# Patient Record
Sex: MALE | NOT HISPANIC OR LATINO | Employment: FULL TIME | URBAN - METROPOLITAN AREA
[De-identification: names, ages, dates, MRNs, and addresses within clinical notes are randomized per-mention and may not be internally consistent; named-entity substitution may affect disease eponyms.]

---

## 2023-03-27 ENCOUNTER — OFFICE VISIT (OUTPATIENT)
Dept: PRIMARY CARE | Facility: CLINIC | Age: 53
End: 2023-03-27
Payer: COMMERCIAL

## 2023-03-27 VITALS
SYSTOLIC BLOOD PRESSURE: 132 MMHG | DIASTOLIC BLOOD PRESSURE: 85 MMHG | WEIGHT: 205 LBS | OXYGEN SATURATION: 98 % | HEART RATE: 84 BPM

## 2023-03-27 DIAGNOSIS — R03.0 ELEVATED BP WITHOUT DIAGNOSIS OF HYPERTENSION: Primary | ICD-10-CM

## 2023-03-27 PROCEDURE — 1036F TOBACCO NON-USER: CPT | Performed by: INTERNAL MEDICINE

## 2023-03-27 PROCEDURE — 99213 OFFICE O/P EST LOW 20 MIN: CPT | Performed by: INTERNAL MEDICINE

## 2023-03-27 ASSESSMENT — PATIENT HEALTH QUESTIONNAIRE - PHQ9
1. LITTLE INTEREST OR PLEASURE IN DOING THINGS: NOT AT ALL
2. FEELING DOWN, DEPRESSED OR HOPELESS: NOT AT ALL
SUM OF ALL RESPONSES TO PHQ9 QUESTIONS 1 AND 2: 0

## 2023-03-27 NOTE — PROGRESS NOTES
Subjective   Patient ID: Ramana Reyes is a 52 y.o. male who presents for Follow-up.    HPI   Overall doing well   BP has been acceptabel at home  Is working on eating less and picking healthier foods   Walks on treadmill 3-4 days weekly   No new issues or concerns     Review of Systems   All other systems reviewed and are negative.      Objective   /86   Pulse 84   Wt 93 kg (205 lb)   SpO2 98%     Physical Exam  Constitutional:       Appearance: Normal appearance.   HENT:      Head: Normocephalic and atraumatic.   Cardiovascular:      Rate and Rhythm: Normal rate and regular rhythm.      Heart sounds: Normal heart sounds. No murmur heard.     No gallop.   Pulmonary:      Effort: Pulmonary effort is normal. No respiratory distress.      Breath sounds: No wheezing or rales.   Abdominal:      General: Abdomen is flat. Bowel sounds are normal.      Palpations: Abdomen is soft.      Tenderness: There is no abdominal tenderness.   Skin:     General: Skin is warm and dry.      Findings: No erythema.   Neurological:      General: No focal deficit present.      Mental Status: He is alert and oriented to person, place, and time. Mental status is at baseline.   Psychiatric:         Mood and Affect: Mood normal.         Behavior: Behavior normal.         Assessment/Plan       #Elevated BP   -Acceptable at home. Cont diet/exercise/wt loss.     Colonoscopy: 2/10/23- 5 years   PSA: 1/14/23    RTC 6 months

## 2023-03-27 NOTE — PATIENT INSTRUCTIONS
KEEP CHECKING BP, NUMBERS ARE ACCEPTABLE. KEPP WALKING !     COME BACK IN 6 MONTHS, CALL ME IF BP GOES MISSY PRIOR TO THAT

## 2023-04-21 ENCOUNTER — OFFICE VISIT (OUTPATIENT)
Dept: PRIMARY CARE | Facility: CLINIC | Age: 53
End: 2023-04-21
Payer: COMMERCIAL

## 2023-04-21 VITALS
OXYGEN SATURATION: 98 % | DIASTOLIC BLOOD PRESSURE: 80 MMHG | SYSTOLIC BLOOD PRESSURE: 138 MMHG | WEIGHT: 201 LBS | HEART RATE: 74 BPM

## 2023-04-21 DIAGNOSIS — G89.29 CHRONIC BILATERAL LOW BACK PAIN WITHOUT SCIATICA: Primary | ICD-10-CM

## 2023-04-21 DIAGNOSIS — M54.50 CHRONIC BILATERAL LOW BACK PAIN WITHOUT SCIATICA: Primary | ICD-10-CM

## 2023-04-21 PROCEDURE — 1036F TOBACCO NON-USER: CPT | Performed by: INTERNAL MEDICINE

## 2023-04-21 PROCEDURE — 99213 OFFICE O/P EST LOW 20 MIN: CPT | Performed by: INTERNAL MEDICINE

## 2023-04-21 RX ORDER — PREDNISONE 20 MG/1
20 TABLET ORAL 2 TIMES DAILY
Qty: 10 TABLET | Refills: 0 | Status: SHIPPED | OUTPATIENT
Start: 2023-04-21 | End: 2023-04-26

## 2023-04-21 ASSESSMENT — PATIENT HEALTH QUESTIONNAIRE - PHQ9
2. FEELING DOWN, DEPRESSED OR HOPELESS: NOT AT ALL
1. LITTLE INTEREST OR PLEASURE IN DOING THINGS: NOT AT ALL
SUM OF ALL RESPONSES TO PHQ9 QUESTIONS 1 AND 2: 0

## 2023-04-21 NOTE — PROGRESS NOTES
Subjective   Patient ID: Ramana Reyes is a 52 y.o. male who presents for Back Pain.    HPI   Reports lower back x 1 mo , L>R. 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 Used a heating pad and hot tube --did not help much, did not take any OTC meds. Standing up and hold a package makes the pain worse. Elevating feet makes the pain better. Avg pain 4/10, worst 8/10.    Review of Systems   All other systems reviewed and are negative.      Objective   /80   Pulse 74   Wt 91.2 kg (201 lb)   SpO2 98%     Physical Exam  Constitutional:       Appearance: Normal appearance.   HENT:      Head: Normocephalic and atraumatic.   Cardiovascular:      Rate and Rhythm: Normal rate and regular rhythm.      Heart sounds: Normal heart sounds. No murmur heard.     No gallop.   Pulmonary:      Effort: Pulmonary effort is normal. No respiratory distress.      Breath sounds: No wheezing or rales.   Skin:     General: Skin is warm and dry.      Findings: No rash.   Neurological:      Mental Status: He is alert and oriented to person, place, and time. Mental status is at baseline.   Psychiatric:         Mood and Affect: Mood normal.         Behavior: Behavior normal.         Assessment/Plan       #Low back pain   -Rx prednisone 20mg bid x 5 days  -Refer to PT

## 2023-04-21 NOTE — PATIENT INSTRUCTIONS
Physical Therapy main number [phone]  [facility] ([facility]) [phone]    Take prednisone 20mg twice daily for 5 days

## 2023-04-28 ENCOUNTER — OFFICE VISIT (OUTPATIENT)
Dept: PRIMARY CARE | Facility: CLINIC | Age: 53
End: 2023-04-28
Payer: COMMERCIAL

## 2023-04-28 VITALS
SYSTOLIC BLOOD PRESSURE: 121 MMHG | WEIGHT: 207 LBS | HEART RATE: 91 BPM | DIASTOLIC BLOOD PRESSURE: 67 MMHG | OXYGEN SATURATION: 96 %

## 2023-04-28 DIAGNOSIS — M54.50 CHRONIC BILATERAL LOW BACK PAIN WITHOUT SCIATICA: Primary | ICD-10-CM

## 2023-04-28 DIAGNOSIS — G89.29 CHRONIC BILATERAL LOW BACK PAIN WITHOUT SCIATICA: Primary | ICD-10-CM

## 2023-04-28 PROCEDURE — 99213 OFFICE O/P EST LOW 20 MIN: CPT | Performed by: INTERNAL MEDICINE

## 2023-04-28 PROCEDURE — 1036F TOBACCO NON-USER: CPT | Performed by: INTERNAL MEDICINE

## 2023-04-28 RX ORDER — CYCLOBENZAPRINE HCL 10 MG
10 TABLET ORAL NIGHTLY PRN
Qty: 30 TABLET | Refills: 0 | Status: SHIPPED | OUTPATIENT
Start: 2023-04-28 | End: 2023-06-05 | Stop reason: ALTCHOICE

## 2023-04-28 RX ORDER — PREDNISONE 10 MG/1
10 TABLET ORAL SEE ADMIN INSTRUCTIONS
Qty: 30 TABLET | Refills: 0 | Status: SHIPPED | OUTPATIENT
Start: 2023-04-28 | End: 2023-06-05 | Stop reason: ALTCHOICE

## 2023-04-28 NOTE — LETTER
April 28, 2023     Patient: Ramana Reyes   YOB: 1970   Date of Visit: 4/28/2023       To Whom It May Concern:    Ramana Reyes was seen in my clinic on 4/28/2023 at 11:45 am. Please excuse Ramana for his absence from work on this day to make the appointment.    Please excuse patient from work April 24, 2023 -April 28, 2023, Can return May 1,2023 no restrictions    If you have any questions or concerns, please don't hesitate to call.         Sincerely,         Feng Rouse,         CC: No Recipients

## 2023-04-28 NOTE — PATIENT INSTRUCTIONS
Take pred taper and flexeril at night for sleep     [facility] main number [phone]  [facility] ([facility]) [phone]

## 2023-04-28 NOTE — PROGRESS NOTES
Subjective   Patient ID: Ramana Reyes is a 52 y.o. male who presents for Back Pain.    HPI     Patient took prednisone for five days,, pain in left lower back improved. After stopping pain worse. No new injury. Standing makes the pain worse, ibuprofen  helped. No pain down his leg.     Review of Systems   All other systems reviewed and are negative.      Objective   /67   Pulse 91   Wt 93.9 kg (207 lb)   SpO2 96%     Physical Exam  Constitutional:       Appearance: Normal appearance.   HENT:      Head: Normocephalic and atraumatic.   Cardiovascular:      Rate and Rhythm: Normal rate and regular rhythm.      Heart sounds: Normal heart sounds. No murmur heard.     No gallop.   Pulmonary:      Effort: Pulmonary effort is normal. No respiratory distress.      Breath sounds: No wheezing or rales.   Musculoskeletal:      Comments: Left lower back pain    Skin:     General: Skin is warm and dry.      Findings: No rash.   Neurological:      Mental Status: He is alert and oriented to person, place, and time. Mental status is at baseline.   Psychiatric:         Mood and Affect: Mood normal.         Behavior: Behavior normal.         Assessment/Plan       #Low back pain   -Rx prednisone taper x 12 days and Flexeril 10mg at bedtime prn   -Encouraged follow up with PT

## 2023-05-01 ENCOUNTER — TELEPHONE (OUTPATIENT)
Dept: PRIMARY CARE | Facility: CLINIC | Age: 53
End: 2023-05-01
Payer: COMMERCIAL

## 2023-05-03 DIAGNOSIS — M54.42 ACUTE LEFT-SIDED LOW BACK PAIN WITH LEFT-SIDED SCIATICA: Primary | ICD-10-CM

## 2023-05-16 ENCOUNTER — TELEPHONE (OUTPATIENT)
Dept: PRIMARY CARE | Facility: CLINIC | Age: 53
End: 2023-05-16
Payer: COMMERCIAL

## 2023-06-05 ENCOUNTER — OFFICE VISIT (OUTPATIENT)
Dept: PRIMARY CARE | Facility: CLINIC | Age: 53
End: 2023-06-05
Payer: COMMERCIAL

## 2023-06-05 VITALS
OXYGEN SATURATION: 98 % | BODY MASS INDEX: 28.37 KG/M2 | WEIGHT: 215 LBS | DIASTOLIC BLOOD PRESSURE: 76 MMHG | HEART RATE: 77 BPM | SYSTOLIC BLOOD PRESSURE: 142 MMHG

## 2023-06-05 DIAGNOSIS — Z01.818 PREOPERATIVE CLEARANCE: Primary | ICD-10-CM

## 2023-06-05 PROCEDURE — 1036F TOBACCO NON-USER: CPT | Performed by: INTERNAL MEDICINE

## 2023-06-05 PROCEDURE — 99213 OFFICE O/P EST LOW 20 MIN: CPT | Performed by: INTERNAL MEDICINE

## 2023-06-05 NOTE — PROGRESS NOTES
Subjective   Patient ID: Ramana Reyes is a 53 y.o. male who presents for Follow-up (Medical clear. ).    HPI     Set to have lumbar surgery on Thursday. Was seen at [facility] and had SBP 150s. BP at home 120-140s.   No CP, SOB  or LE edema   Is anxious for upcoming sugery     Review of Systems    Objective   /76   Pulse 77   Wt 97.5 kg (215 lb)   SpO2 98%   BMI 28.37 kg/m²     Physical Exam  Constitutional:       Appearance: Normal appearance.   HENT:      Head: Normocephalic and atraumatic.   Cardiovascular:      Rate and Rhythm: Normal rate and regular rhythm.      Heart sounds: Normal heart sounds. No murmur heard.     No gallop.   Pulmonary:      Effort: Pulmonary effort is normal. No respiratory distress.      Breath sounds: No wheezing or rales.   Skin:     General: Skin is warm and dry.      Findings: No rash.   Neurological:      Mental Status: He is alert and oriented to person, place, and time. Mental status is at baseline.   Psychiatric:         Mood and Affect: Mood normal.         Behavior: Behavior normal.         Assessment/Plan        #Preoperative clearance   -BP is acceptable. He is cleared for surgery from my perspective.

## 2023-06-08 ENCOUNTER — HOSPITAL ENCOUNTER (OUTPATIENT)
Dept: DATA CONVERSION | Facility: HOSPITAL | Age: 53
End: 2023-06-08
Attending: NEUROLOGICAL SURGERY | Admitting: NEUROLOGICAL SURGERY
Payer: COMMERCIAL

## 2023-06-08 DIAGNOSIS — M51.16 INTERVERTEBRAL DISC DISORDERS WITH RADICULOPATHY, LUMBAR REGION: ICD-10-CM

## 2023-06-08 DIAGNOSIS — I45.10 UNSPECIFIED RIGHT BUNDLE-BRANCH BLOCK: ICD-10-CM

## 2023-06-08 DIAGNOSIS — Z79.891 LONG TERM (CURRENT) USE OF OPIATE ANALGESIC: ICD-10-CM

## 2023-06-08 DIAGNOSIS — Z98.52 VASECTOMY STATUS: ICD-10-CM

## 2023-09-07 VITALS — WEIGHT: 216.05 LBS | BODY MASS INDEX: 28.63 KG/M2 | HEIGHT: 73 IN

## 2023-09-29 ENCOUNTER — OFFICE VISIT (OUTPATIENT)
Dept: PRIMARY CARE | Facility: CLINIC | Age: 53
End: 2023-09-29
Payer: COMMERCIAL

## 2023-09-29 VITALS
WEIGHT: 225 LBS | HEIGHT: 72 IN | BODY MASS INDEX: 30.48 KG/M2 | OXYGEN SATURATION: 95 % | DIASTOLIC BLOOD PRESSURE: 93 MMHG | HEART RATE: 92 BPM | SYSTOLIC BLOOD PRESSURE: 151 MMHG

## 2023-09-29 DIAGNOSIS — R03.0 ELEVATED BP WITHOUT DIAGNOSIS OF HYPERTENSION: Primary | ICD-10-CM

## 2023-09-29 DIAGNOSIS — R29.818 SUSPECTED SLEEP APNEA: ICD-10-CM

## 2023-09-29 PROCEDURE — 99213 OFFICE O/P EST LOW 20 MIN: CPT | Performed by: INTERNAL MEDICINE

## 2023-09-29 PROCEDURE — 1036F TOBACCO NON-USER: CPT | Performed by: INTERNAL MEDICINE

## 2023-09-29 RX ORDER — CHLORHEXIDINE GLUCONATE ORAL RINSE 1.2 MG/ML
SOLUTION DENTAL
COMMUNITY
Start: 2023-06-01 | End: 2023-09-29 | Stop reason: ALTCHOICE

## 2023-09-29 ASSESSMENT — PATIENT HEALTH QUESTIONNAIRE - PHQ9
1. LITTLE INTEREST OR PLEASURE IN DOING THINGS: NOT AT ALL
SUM OF ALL RESPONSES TO PHQ9 QUESTIONS 1 AND 2: 0
2. FEELING DOWN, DEPRESSED OR HOPELESS: NOT AT ALL

## 2023-09-29 NOTE — PROGRESS NOTES
Subjective   Patient ID: Ramana Reyes is a 53 y.o. male who presents for No chief complaint on file..    HPI     Overall doing well   Has not been checking his BP at home  S/p L4/L5 hemilaminectomy and microdiscectomy   He is biking 2-4 hrs daily   Will be going back to work Next week     His wife reports excessive snoring when sleeping. No witnessed apenea. He does have borderline HTN. No headaches or excessive daytime sleepiness      Review of Systems   All other systems reviewed and are negative.      Objective   BP (!) 151/93   Pulse 92   Ht 1.829 m (6')   Wt 102 kg (225 lb)   SpO2 95%   BMI 30.52 kg/m²     Physical Exam  Constitutional:       Appearance: Normal appearance.   HENT:      Head: Normocephalic and atraumatic.   Cardiovascular:      Rate and Rhythm: Normal rate and regular rhythm.      Heart sounds: Normal heart sounds. No murmur heard.     No gallop.   Pulmonary:      Effort: Pulmonary effort is normal. No respiratory distress.      Breath sounds: No wheezing or rales.   Skin:     General: Skin is warm and dry.      Findings: No rash.   Neurological:      Mental Status: He is alert and oriented to person, place, and time. Mental status is at baseline.   Psychiatric:         Mood and Affect: Mood normal.         Behavior: Behavior normal.         Assessment/Plan       #Elevated BP  -Is typically good at home. Will check BP over the weekend and send me numbers via [facility]     #Suspected OUMAR  -STOP BAN  -Order home PSG

## 2023-09-30 NOTE — H&P
History & Physical Reviewed:   I have reviewed the History and Physical dated:  30-May-2023   History and Physical reviewed and relevant findings noted. Patient examined to review pertinent physical  findings.: No significant changes   Home Medications Reviewed: no changes noted   Allergies Reviewed: no changes noted       ERAS (Enhanced Recovery After Surgery):  ·  ERAS Patient: yes   ·  CPM/PAT Utilization: yes   ·  Immunonutrition Recovery Drink Utilization: yes   ·  Carbohydrate Supplement Drink Utilization: yes     Consent:   COVID-19 Consent:  ·  COVID-19 Risk Consent Surgeon has reviewed key risks related to the risk of doug COVID-19 and if they contract COVID-19 what the risks are.       Electronic Signatures:  Maliha Justice (PA (PHYSICIAN))  (Signed 08-Jun-2023 09:25)   Authored: History & Physical Reviewed, ERAS, Consent,  Note Completion      Last Updated: 08-Jun-2023 09:25 by Maliha Justice (PA (PHYSICIAN))

## 2023-10-02 NOTE — OP NOTE
PROCEDURE DETAILS      Postoperative Diagnosis:  left L4-5 lumbar disc herniation with radiculopathy  Surgeon: Gasper Lechuga  Resident/Fellow/Other Assistant: Marcus Guallpa    Procedure:  1. MINIMALLY INVASIVE LEFT L4-5 LUMBAR SPINE HEMILAMINECTOMY AND MICRODISCECTOMY    Anesthesia: No anesthesiologist associated with this case  Estimated Blood Loss: 25 ml  Findings: see op note        Operative Report:   After informed consent was obtained, the  patient was brought back to the operating room and general anesthesia to the anesthesia team.  The patient was then flipped prone onto a Denys table with Parvez frame.  The lower back was widely clipped, prepped, and draped in the usual sterile fashion.   Using intraoperative fluoroscopy a 2 cm incision was planned to the left of midline centered over the L4-5 disc space.  Incision was made with 10 blade.  Subdermal dissection was done with monopolar cautery.  The Viddler minimally invasive spine  system was used.  The a dilator was placed at the inferior edge of the L4 lamina on the left side and progressive dilation was done and a 20 mm x 60 mm tube was picked out and this was fixed in this position using intraoperative fluoroscopy and connected  to the bed attachment arm.  The operative microscope was brought in and soft tissue dissection was done.  A left L4 hemilaminotomy and partial medial left L4-5 facetectomy was done with a combination of the high-speed matchstick drill and Kerrison rongeurs.   The ligamentum flavum was removed the shoulder of the L5 nerve root and thecal sac was retracted medially a large disc herniation was seen.  The capsule was coagulated and cut with 11 blade.  Multiple large herniated disc fragments were then removed  with angled curettes and micropituitary rongeurs.  Hemostasis was achieved with Floseal and bipolar cautery.  The wound was copiously irrigated with antibiotic saline.  Hemostasis was achieved and the soft  tissue was that tube was removed.  The wound  was then closed in layers with interrupted 0 Vicryl and 2-0 Vicryl sutures.  The skin was closed with a running 4-0 Monocryl subcuticular stitch.  The wound was washed, dried and a single layer of LiquiBand dressing was placed over the incision.  After  this was dry, the patient was then flipped supine onto his bed and handed over the anesthesia team.  He was extubated and taken to the recovery area in stable condition.                          Attestation:   Note Completion:  Attending Attestation I performed the procedure without a resident         Electronic Signatures:  Gasper Lechuga)  (Signed 08-Jun-2023 15:44)   Authored: Post-Operative Note, Chart Review, Note Completion      Last Updated: 08-Jun-2023 15:44 by Gasper Lechuga)

## 2023-10-04 DIAGNOSIS — I10 PRIMARY HYPERTENSION: Primary | ICD-10-CM

## 2023-10-04 RX ORDER — OLMESARTAN MEDOXOMIL 20 MG/1
20 TABLET ORAL DAILY
Qty: 30 TABLET | Refills: 1 | Status: SHIPPED | OUTPATIENT
Start: 2023-10-04 | End: 2023-10-30

## 2023-10-28 DIAGNOSIS — I10 PRIMARY HYPERTENSION: ICD-10-CM

## 2023-10-30 RX ORDER — OLMESARTAN MEDOXOMIL 20 MG/1
20 TABLET ORAL DAILY
Qty: 90 TABLET | Refills: 0 | Status: SHIPPED | OUTPATIENT
Start: 2023-10-30 | End: 2024-01-31

## 2023-10-30 NOTE — TELEPHONE ENCOUNTER
Requested Prescriptions     Pending Prescriptions Disp Refills    olmesartan (BENIcar) 20 mg tablet [Pharmacy Med Name: OLMESARTAN MEDOXOMIL 20 MG TAB] 90 tablet 0     Sig: TAKE 1 TABLET BY MOUTH EVERY DAY

## 2023-11-01 ENCOUNTER — CLINICAL SUPPORT (OUTPATIENT)
Dept: SLEEP MEDICINE | Facility: CLINIC | Age: 53
End: 2023-11-01
Payer: COMMERCIAL

## 2023-11-01 DIAGNOSIS — G47.33 OBSTRUCTIVE SLEEP APNEA (ADULT) (PEDIATRIC): ICD-10-CM

## 2023-11-01 DIAGNOSIS — R29.818 SUSPECTED SLEEP APNEA: ICD-10-CM

## 2023-11-01 PROCEDURE — 95806 SLEEP STUDY UNATT&RESP EFFT: CPT | Performed by: INTERNAL MEDICINE

## 2023-11-03 VITALS — HEIGHT: 72 IN | BODY MASS INDEX: 30.46 KG/M2 | WEIGHT: 224.87 LBS

## 2023-11-03 NOTE — PROGRESS NOTES
Type of Study: HOME SLEEP STUDY - NOMAD     The patient received equipment and instructions for use of the [facility] Nom[facility] HSAT device. The patient was instructed how to apply the effort belts, cannula, thermistor. It was also explained how the Nomad and oximeter components work.  The patient was asked to record their sleep for an 8-hour period.     The patient was informed of their responsibility for the device and acknowledged this by signing the HSAT device contract. The patient was asked to return the device on 11/02/2023 to the Sleep Center.     The patient was instructed to call 911 as usual for any medical- emergencies while at home.  The patient was also given a phone number for troubleshooting when using the device in case there were additional questions.

## 2023-11-07 DIAGNOSIS — G47.33 OBSTRUCTIVE SLEEP APNEA SYNDROME: Primary | ICD-10-CM

## 2023-11-17 ENCOUNTER — APPOINTMENT (OUTPATIENT)
Dept: PRIMARY CARE | Facility: CLINIC | Age: 53
End: 2023-11-17
Payer: COMMERCIAL

## 2023-11-20 ENCOUNTER — OFFICE VISIT (OUTPATIENT)
Dept: PRIMARY CARE | Facility: CLINIC | Age: 53
End: 2023-11-20
Payer: COMMERCIAL

## 2023-11-20 VITALS
WEIGHT: 214 LBS | DIASTOLIC BLOOD PRESSURE: 73 MMHG | SYSTOLIC BLOOD PRESSURE: 113 MMHG | HEART RATE: 77 BPM | BODY MASS INDEX: 28.99 KG/M2 | OXYGEN SATURATION: 96 %

## 2023-11-20 DIAGNOSIS — I10 PRIMARY HYPERTENSION: Primary | ICD-10-CM

## 2023-11-20 PROBLEM — R26.2 DIFFICULTY WALKING: Status: ACTIVE | Noted: 2023-11-20

## 2023-11-20 PROBLEM — R29.898 WEAKNESS OF LEFT LOWER EXTREMITY: Status: ACTIVE | Noted: 2023-11-20

## 2023-11-20 PROBLEM — M25.69 DECREASED ROM OF TRUNK AND BACK: Status: ACTIVE | Noted: 2023-11-20

## 2023-11-20 PROBLEM — M54.16 LUMBAR RADICULOPATHY: Status: ACTIVE | Noted: 2023-11-20

## 2023-11-20 PROBLEM — M51.26 LUMBAR DISC HERNIATION: Status: ACTIVE | Noted: 2023-11-20

## 2023-11-20 PROCEDURE — 3074F SYST BP LT 130 MM HG: CPT | Performed by: INTERNAL MEDICINE

## 2023-11-20 PROCEDURE — 99213 OFFICE O/P EST LOW 20 MIN: CPT | Performed by: INTERNAL MEDICINE

## 2023-11-20 PROCEDURE — 1036F TOBACCO NON-USER: CPT | Performed by: INTERNAL MEDICINE

## 2023-11-20 PROCEDURE — 3078F DIAST BP <80 MM HG: CPT | Performed by: INTERNAL MEDICINE

## 2023-11-20 NOTE — PROGRESS NOTES
Subjective   Patient ID: Ramana Reyes is a 53 y.o. male who presents for Follow-up (Bp recheck).    HPI     Overall doing well  No specific concerns   BP is good  at home  Has mild lower back pain, no pain down leg, is tolerable and wearing back brace  No CP, SOB, MARES or LE edema       Review of Systems   All other systems reviewed and are negative.      Objective   There were no vitals taken for this visit.    Physical Exam  Constitutional:       Appearance: Normal appearance.   HENT:      Head: Normocephalic and atraumatic.   Cardiovascular:      Rate and Rhythm: Normal rate and regular rhythm.      Heart sounds: Normal heart sounds. No murmur heard.     No gallop.   Pulmonary:      Effort: Pulmonary effort is normal. No respiratory distress.      Breath sounds: No wheezing or rales.   Skin:     General: Skin is warm and dry.      Findings: No rash.   Neurological:      Mental Status: He is alert and oriented to person, place, and time. Mental status is at baseline.   Psychiatric:         Mood and Affect: Mood normal.         Behavior: Behavior normal.         Assessment/Plan       #HTN  -Well controlled. Cont olmesartan 20mg daily

## 2024-04-18 NOTE — PROGRESS NOTES
Subjective   Patient ID: Ramana Reyes is a 53 y.o. male who presents for Hypertension and Follow-up.    HPI   Overall doing well  No specific concerns   BP is good at home-usually 110s-130s/80s  Back pain doing well for the time being.  Noticed lump in back of neck maybe 7 months ago. Non tender. May have gotten smaller over time.  No CP, SOB, MARES or LE edema     Review of Systems   All other systems reviewed and are negative.      Objective   BP (!) 138/94   Pulse 80   Wt 94.3 kg (208 lb)   SpO2 96%   BMI 28.17 kg/m²     Physical Exam  Constitutional:       Appearance: Normal appearance.   HENT:      Head: Normocephalic and atraumatic.   Cardiovascular:      Rate and Rhythm: Normal rate and regular rhythm.      Heart sounds: Normal heart sounds. No murmur heard.     No gallop.   Pulmonary:      Effort: Pulmonary effort is normal. No respiratory distress.      Breath sounds: No wheezing or rales.   Musculoskeletal:      Comments: Small subcutaneous mass/lump upper posterior cervical region, non-tender, mobile with palpation.   Skin:     General: Skin is warm and dry.      Findings: No rash.   Neurological:      Mental Status: He is alert and oriented to person, place, and time. Mental status is at baseline.   Psychiatric:         Mood and Affect: Mood normal.         Behavior: Behavior normal.         Assessment/Plan   #HTN  -Well controlled. Cont olmesartan 20mg daily     #Neck Mass  -left posterior cervical region, non-tender, mobile  -Gen surg referral    Flu: Not last year  Covid: x2  Shingrex: Recommended  CRC: 2/20/23--5 years  PSA: 1/14/23--ordered    RTC 1 year       Ja Pickard DO  Internal [facility] PGY-1

## 2024-04-19 ENCOUNTER — OFFICE VISIT (OUTPATIENT)
Dept: PRIMARY CARE | Facility: CLINIC | Age: 54
End: 2024-04-19
Payer: COMMERCIAL

## 2024-04-19 VITALS
HEART RATE: 80 BPM | OXYGEN SATURATION: 96 % | DIASTOLIC BLOOD PRESSURE: 94 MMHG | WEIGHT: 208 LBS | BODY MASS INDEX: 28.17 KG/M2 | SYSTOLIC BLOOD PRESSURE: 138 MMHG

## 2024-04-19 DIAGNOSIS — R22.1 NECK MASS: ICD-10-CM

## 2024-04-19 DIAGNOSIS — I10 PRIMARY HYPERTENSION: Primary | ICD-10-CM

## 2024-04-19 DIAGNOSIS — Z12.5 SCREENING FOR PROSTATE CANCER: ICD-10-CM

## 2024-04-19 PROCEDURE — 3075F SYST BP GE 130 - 139MM HG: CPT | Performed by: INTERNAL MEDICINE

## 2024-04-19 PROCEDURE — 3080F DIAST BP >= 90 MM HG: CPT | Performed by: INTERNAL MEDICINE

## 2024-04-19 PROCEDURE — 99214 OFFICE O/P EST MOD 30 MIN: CPT | Performed by: INTERNAL MEDICINE

## 2024-04-19 NOTE — PATIENT INSTRUCTIONS
Please complete fasting blood work. Fast for 10 hours, black coffee and water the morning of labs are OK.     Call General Surgery ([phone]) to assess the lump in the back of your neck.    Return to clinic in 6 months.

## 2024-05-20 ENCOUNTER — LAB (OUTPATIENT)
Dept: LAB | Facility: LAB | Age: 54
End: 2024-05-20
Payer: COMMERCIAL

## 2024-05-20 ENCOUNTER — OFFICE VISIT (OUTPATIENT)
Dept: SURGERY | Facility: CLINIC | Age: 54
End: 2024-05-20
Payer: COMMERCIAL

## 2024-05-20 VITALS
DIASTOLIC BLOOD PRESSURE: 85 MMHG | HEIGHT: 73 IN | BODY MASS INDEX: 27.17 KG/M2 | HEART RATE: 63 BPM | SYSTOLIC BLOOD PRESSURE: 126 MMHG | OXYGEN SATURATION: 97 % | WEIGHT: 205 LBS

## 2024-05-20 DIAGNOSIS — Z12.5 SCREENING FOR PROSTATE CANCER: ICD-10-CM

## 2024-05-20 DIAGNOSIS — R73.01 IFG (IMPAIRED FASTING GLUCOSE): ICD-10-CM

## 2024-05-20 DIAGNOSIS — R22.1 NECK MASS: ICD-10-CM

## 2024-05-20 DIAGNOSIS — I10 PRIMARY HYPERTENSION: ICD-10-CM

## 2024-05-20 LAB
ALBUMIN SERPL BCP-MCNC: 4.3 G/DL (ref 3.4–5)
ALP SERPL-CCNC: 61 U/L (ref 33–120)
ALT SERPL W P-5'-P-CCNC: 23 U/L (ref 10–52)
ANION GAP SERPL CALC-SCNC: 9 MMOL/L (ref 10–20)
AST SERPL W P-5'-P-CCNC: 23 U/L (ref 9–39)
BILIRUB SERPL-MCNC: 0.7 MG/DL (ref 0–1.2)
BUN SERPL-MCNC: 16 MG/DL (ref 6–23)
CALCIUM SERPL-MCNC: 9 MG/DL (ref 8.6–10.3)
CHLORIDE SERPL-SCNC: 102 MMOL/L (ref 98–107)
CHOLEST SERPL-MCNC: 188 MG/DL (ref 0–199)
CHOLESTEROL/HDL RATIO: 3.7
CO2 SERPL-SCNC: 29 MMOL/L (ref 21–32)
CREAT SERPL-MCNC: 0.96 MG/DL (ref 0.5–1.3)
EGFRCR SERPLBLD CKD-EPI 2021: >90 ML/MIN/1.73M*2
ERYTHROCYTE [DISTWIDTH] IN BLOOD BY AUTOMATED COUNT: 11.9 % (ref 11.5–14.5)
GLUCOSE SERPL-MCNC: 110 MG/DL (ref 74–99)
HCT VFR BLD AUTO: 44 % (ref 41–52)
HDLC SERPL-MCNC: 50.4 MG/DL
HGB BLD-MCNC: 15.1 G/DL (ref 13.5–17.5)
LDLC SERPL CALC-MCNC: 123 MG/DL
MCH RBC QN AUTO: 30.9 PG (ref 26–34)
MCHC RBC AUTO-ENTMCNC: 34.3 G/DL (ref 32–36)
MCV RBC AUTO: 90 FL (ref 80–100)
NON HDL CHOLESTEROL: 138 MG/DL (ref 0–149)
NRBC BLD-RTO: 0 /100 WBCS (ref 0–0)
PLATELET # BLD AUTO: 204 X10*3/UL (ref 150–450)
POTASSIUM SERPL-SCNC: 4.3 MMOL/L (ref 3.5–5.3)
PROT SERPL-MCNC: 6.3 G/DL (ref 6.4–8.2)
PSA SERPL-MCNC: 0.63 NG/ML
RBC # BLD AUTO: 4.89 X10*6/UL (ref 4.5–5.9)
SODIUM SERPL-SCNC: 136 MMOL/L (ref 136–145)
TRIGL SERPL-MCNC: 72 MG/DL (ref 0–149)
VLDL: 14 MG/DL (ref 0–40)
WBC # BLD AUTO: 4.7 X10*3/UL (ref 4.4–11.3)

## 2024-05-20 PROCEDURE — 85027 COMPLETE CBC AUTOMATED: CPT

## 2024-05-20 PROCEDURE — 80061 LIPID PANEL: CPT

## 2024-05-20 PROCEDURE — 1036F TOBACCO NON-USER: CPT | Performed by: SURGERY

## 2024-05-20 PROCEDURE — 99202 OFFICE O/P NEW SF 15 MIN: CPT | Performed by: SURGERY

## 2024-05-20 PROCEDURE — 36415 COLL VENOUS BLD VENIPUNCTURE: CPT

## 2024-05-20 PROCEDURE — 80053 COMPREHEN METABOLIC PANEL: CPT

## 2024-05-20 PROCEDURE — 84153 ASSAY OF PSA TOTAL: CPT

## 2024-05-20 PROCEDURE — 83036 HEMOGLOBIN GLYCOSYLATED A1C: CPT

## 2024-05-20 NOTE — PROGRESS NOTES
Subjective   Patient ID: Ramana Reyes is a 54 y.o. male who presents for New Patient Visit (Patient was referred by Feng Rouse DO for neck mass. Mass has been on the left side of the back of the lower head for 6 months or so. Patient denies pain or discomfort. ).    Chief Complaint   Patient presents with    New Patient Visit     Patient was referred by Feng Rouse DO for neck mass. Mass has been on the left side of the back of the lower head for 6 months or so. Patient denies pain or discomfort.        History of Presenting Illness:  54M with L posterior neck mass for about 6 months.  Felt it when was scratching his neck - denies change in size, no output, no pain, no numbness/tingling, no difficulty moving arm, no drainage, no skin changes.      Review of Systems:  Constitutional: Denies changes in weight, fatigue, night-sweats  HEENT: No changes in vision, nasal drainage, headache  CV: No palpitations, no chest pain, no lower extremity oedema  Resp: No wheezing, no cough, no shortness of breath  GI: No nausea, vomiting, diarrhoea, constipation  : No dysuria, no increased frequency  Neuro: No weakness, confusion, numbness, dizziness  MSK: No weakness, arthralgias, myalgias  Haem: No easy bruising, no easy bleeding  Skin: No new lesions or rashes  Endocrine: No polydipsia, polyuria, heat/cold insensitivity    Past Medical History:   Diagnosis Date    Hypertension        Past Surgical History:   Procedure Laterality Date    BACK SURGERY         Current Outpatient Medications on File Prior to Visit   Medication Sig Dispense Refill    olmesartan (BENIcar) 20 mg tablet TAKE 1 TABLET BY MOUTH EVERY DAY 90 tablet 1     No current facility-administered medications on file prior to visit.        No Known Allergies    Social History     Socioeconomic History    Marital status:      Spouse name: Not on file    Number of children: Not on file    Years of education: Not on file    Highest education level: Not on  "file   Occupational History    Not on file   Tobacco Use    Smoking status: Never    Smokeless tobacco: Never   Vaping Use    Vaping status: Never Used   Substance and Sexual Activity    Alcohol use: Yes     Alcohol/week: 3.0 - 6.0 standard drinks of alcohol     Types: 3 - 6 Standard drinks or equivalent per week    Drug use: Never    Sexual activity: Not on file   Other Topics Concern    Not on file   Social History Narrative    Not on file     Social Determinants of Health     Financial Resource Strain: Not on file   Food Insecurity: Not on file   Transportation Needs: Not on file   Physical Activity: Not on file   Stress: Not on file   Social Connections: Not on file   Intimate Partner Violence: Not on file   Housing Stability: Not on file       No family history on file.    Objective   /85   Pulse 63   Ht 1.854 m (6' 1\")   Wt 93 kg (205 lb)   SpO2 97%   BMI 27.05 kg/m²     Physical Exam  Vitals reviewed.   Neck:      Comments: L occipital triangle below occiput, palpable mass approximately 1 - 1 1/2cm ovoid, no skin tethering, soft but formed, smooth edges  Musculoskeletal:         General: Normal range of motion.      Cervical back: Normal range of motion and neck supple. No rigidity or tenderness.   Lymphadenopathy:      Cervical: No cervical adenopathy.   Skin:     General: Skin is warm.      Capillary Refill: Capillary refill takes less than 2 seconds.   Neurological:      General: No focal deficit present.      Mental Status: He is alert.   Psychiatric:         Mood and Affect: Mood normal.         Assessment/Plan   54M with L occipital triangle mass possibly lipoma, asymptomatic otherwise  - patient will reassess in 3-6 months for changes and will return if wishes to have it removed then  Problem List Items Addressed This Visit    None  Visit Diagnoses         Codes    Neck mass     R22.1                 Kylie Serrano MD 05/20/24 8:20 AM   "

## 2024-05-22 DIAGNOSIS — R73.01 IFG (IMPAIRED FASTING GLUCOSE): Primary | ICD-10-CM

## 2024-05-22 LAB
EST. AVERAGE GLUCOSE BLD GHB EST-MCNC: 114 MG/DL
HBA1C MFR BLD: 5.6 %

## 2024-07-29 DIAGNOSIS — I10 PRIMARY HYPERTENSION: ICD-10-CM

## 2024-07-29 RX ORDER — OLMESARTAN MEDOXOMIL 20 MG/1
20 TABLET ORAL DAILY
Qty: 90 TABLET | Refills: 1 | Status: SHIPPED | OUTPATIENT
Start: 2024-07-29

## 2024-08-16 ENCOUNTER — TELEMEDICINE (OUTPATIENT)
Dept: PRIMARY CARE | Facility: CLINIC | Age: 54
End: 2024-08-16
Payer: COMMERCIAL

## 2024-08-16 DIAGNOSIS — L25.9 CONTACT DERMATITIS, UNSPECIFIED CONTACT DERMATITIS TYPE, UNSPECIFIED TRIGGER: Primary | ICD-10-CM

## 2024-08-16 RX ORDER — PREDNISONE 10 MG/1
10 TABLET ORAL SEE ADMIN INSTRUCTIONS
Qty: 30 TABLET | Refills: 0 | Status: SHIPPED | OUTPATIENT
Start: 2024-08-16

## 2024-08-16 NOTE — PROGRESS NOTES
Subjective   Patient ID: Ramana Reyes is a 54 y.o. male who presents for No chief complaint on file..    HPI     Itchy rash x 3-4 days. Was working in yard. On his arms and legs . No fever or chills    Review of Systems   All other systems reviewed and are negative.      Objective   There were no vitals taken for this visit.    Physical Exam    NAD  Talks in complete sentences  Mood and affect are appropriate       Assessment/Plan       #Contact dermatitis   -Rx pred taper

## 2025-01-26 DIAGNOSIS — I10 PRIMARY HYPERTENSION: ICD-10-CM

## 2025-01-28 RX ORDER — OLMESARTAN MEDOXOMIL 20 MG/1
20 TABLET ORAL DAILY
Qty: 90 TABLET | Refills: 1 | Status: SHIPPED | OUTPATIENT
Start: 2025-01-28

## 2025-05-21 ENCOUNTER — APPOINTMENT (OUTPATIENT)
Dept: PRIMARY CARE | Facility: CLINIC | Age: 55
End: 2025-05-21
Payer: COMMERCIAL

## 2025-05-21 VITALS
DIASTOLIC BLOOD PRESSURE: 81 MMHG | HEART RATE: 84 BPM | WEIGHT: 207 LBS | OXYGEN SATURATION: 96 % | SYSTOLIC BLOOD PRESSURE: 127 MMHG | BODY MASS INDEX: 27.43 KG/M2 | HEIGHT: 73 IN

## 2025-05-21 DIAGNOSIS — L98.9 SKIN LESION: ICD-10-CM

## 2025-05-21 DIAGNOSIS — Z12.5 SCREENING FOR PROSTATE CANCER: ICD-10-CM

## 2025-05-21 DIAGNOSIS — R20.0 HAND NUMBNESS: ICD-10-CM

## 2025-05-21 DIAGNOSIS — Z00.01 ENCOUNTER FOR PREVENTATIVE ADULT HEALTH CARE EXAM WITH ABNORMAL FINDINGS: Primary | ICD-10-CM

## 2025-05-21 PROCEDURE — 1036F TOBACCO NON-USER: CPT | Performed by: INTERNAL MEDICINE

## 2025-05-21 PROCEDURE — 99396 PREV VISIT EST AGE 40-64: CPT | Performed by: INTERNAL MEDICINE

## 2025-05-21 PROCEDURE — 3008F BODY MASS INDEX DOCD: CPT | Performed by: INTERNAL MEDICINE

## 2025-05-21 ASSESSMENT — PROMIS GLOBAL HEALTH SCALE
EMOTIONAL_PROBLEMS: NEVER
RATE_AVERAGE_FATIGUE: MILD
RATE_PHYSICAL_HEALTH: VERY GOOD
RATE_SOCIAL_SATISFACTION: EXCELLENT
RATE_MENTAL_HEALTH: EXCELLENT
RATE_GENERAL_HEALTH: VERY GOOD
RATE_QUALITY_OF_LIFE: EXCELLENT
CARRYOUT_SOCIAL_ACTIVITIES: VERY GOOD
RATE_AVERAGE_PAIN: 1
CARRYOUT_PHYSICAL_ACTIVITIES: COMPLETELY

## 2025-05-21 ASSESSMENT — COLUMBIA-SUICIDE SEVERITY RATING SCALE - C-SSRS
2. HAVE YOU ACTUALLY HAD ANY THOUGHTS OF KILLING YOURSELF?: NO
1. IN THE PAST MONTH, HAVE YOU WISHED YOU WERE DEAD OR WISHED YOU COULD GO TO SLEEP AND NOT WAKE UP?: NO
6. HAVE YOU EVER DONE ANYTHING, STARTED TO DO ANYTHING, OR PREPARED TO DO ANYTHING TO END YOUR LIFE?: NO

## 2025-05-21 ASSESSMENT — ENCOUNTER SYMPTOMS
DEPRESSION: 0
LOSS OF SENSATION IN FEET: 0
OCCASIONAL FEELINGS OF UNSTEADINESS: 0

## 2025-05-21 NOTE — PATIENT INSTRUCTIONS
Please complete fasting blood work. Fast for 10 hours, black coffee and water the morning of labs are OK.     Dr. Cordova (hand ) [phone]     [facility] will call you for derm or call number on form     1 year

## 2025-05-22 LAB
ALBUMIN SERPL-MCNC: 4.4 G/DL (ref 3.6–5.1)
ALP SERPL-CCNC: 56 U/L (ref 35–144)
ALT SERPL-CCNC: 18 U/L (ref 9–46)
ANION GAP SERPL CALCULATED.4IONS-SCNC: 8 MMOL/L (CALC) (ref 7–17)
AST SERPL-CCNC: 19 U/L (ref 10–35)
BILIRUB SERPL-MCNC: 0.9 MG/DL (ref 0.2–1.2)
BUN SERPL-MCNC: 18 MG/DL (ref 7–25)
CALCIUM SERPL-MCNC: 9.2 MG/DL (ref 8.6–10.3)
CHLORIDE SERPL-SCNC: 103 MMOL/L (ref 98–110)
CHOLEST SERPL-MCNC: 193 MG/DL
CHOLEST/HDLC SERPL: 3.3 (CALC)
CO2 SERPL-SCNC: 27 MMOL/L (ref 20–32)
CREAT SERPL-MCNC: 0.94 MG/DL (ref 0.7–1.3)
EGFRCR SERPLBLD CKD-EPI 2021: 97 ML/MIN/1.73M2
ERYTHROCYTE [DISTWIDTH] IN BLOOD BY AUTOMATED COUNT: 12.2 % (ref 11–15)
EST. AVERAGE GLUCOSE BLD GHB EST-MCNC: 117 MG/DL
EST. AVERAGE GLUCOSE BLD GHB EST-SCNC: 6.5 MMOL/L
GLUCOSE SERPL-MCNC: 100 MG/DL (ref 65–99)
HBA1C MFR BLD: 5.7 %
HCT VFR BLD AUTO: 45 % (ref 38.5–50)
HDLC SERPL-MCNC: 59 MG/DL
HGB BLD-MCNC: 14.9 G/DL (ref 13.2–17.1)
LDLC SERPL CALC-MCNC: 118 MG/DL (CALC)
MCH RBC QN AUTO: 31.3 PG (ref 27–33)
MCHC RBC AUTO-ENTMCNC: 33.1 G/DL (ref 32–36)
MCV RBC AUTO: 94.5 FL (ref 80–100)
NONHDLC SERPL-MCNC: 134 MG/DL (CALC)
PLATELET # BLD AUTO: 194 THOUSAND/UL (ref 140–400)
PMV BLD REES-ECKER: 10.2 FL (ref 7.5–12.5)
POTASSIUM SERPL-SCNC: 4.5 MMOL/L (ref 3.5–5.3)
PROT SERPL-MCNC: 6.5 G/DL (ref 6.1–8.1)
PSA SERPL-MCNC: 0.59 NG/ML
RBC # BLD AUTO: 4.76 MILLION/UL (ref 4.2–5.8)
SODIUM SERPL-SCNC: 138 MMOL/L (ref 135–146)
TRIGL SERPL-MCNC: 72 MG/DL
WBC # BLD AUTO: 5.4 THOUSAND/UL (ref 3.8–10.8)

## 2025-05-23 ENCOUNTER — APPOINTMENT (OUTPATIENT)
Dept: PRIMARY CARE | Facility: CLINIC | Age: 55
End: 2025-05-23
Payer: COMMERCIAL

## 2025-07-06 ASSESSMENT — DERMATOLOGY QUALITY OF LIFE (QOL) ASSESSMENT
RATE HOW EMOTIONALLY BOTHERED YOU ARE BY YOUR SKIN PROBLEM (FOR EXAMPLE, WORRY, EMBARRASSMENT, FRUSTRATION): 2
RATE HOW EMOTIONALLY BOTHERED YOU ARE BY YOUR SKIN PROBLEM (FOR EXAMPLE, WORRY, EMBARRASSMENT, FRUSTRATION): 2
RATE HOW BOTHERED YOU ARE BY SYMPTOMS OF YOUR SKIN PROBLEM (EG, ITCHING, STINGING BURNING, HURTING OR SKIN IRRITATION): 0 - NEVER BOTHERED
RATE HOW BOTHERED YOU ARE BY SYMPTOMS OF YOUR SKIN PROBLEM (EG, ITCHING, STINGING BURNING, HURTING OR SKIN IRRITATION): 0 - NEVER BOTHERED
RATE HOW BOTHERED YOU ARE BY EFFECTS OF YOUR SKIN PROBLEMS ON YOUR ACTIVITIES (EG, GOING OUT, ACCOMPLISHING WHAT YOU WANT, WORK ACTIVITIES OR YOUR RELATIONSHIPS WITH OTHERS): 0 - NEVER BOTHERED
RATE HOW BOTHERED YOU ARE BY EFFECTS OF YOUR SKIN PROBLEMS ON YOUR ACTIVITIES (EG, GOING OUT, ACCOMPLISHING WHAT YOU WANT, WORK ACTIVITIES OR YOUR RELATIONSHIPS WITH OTHERS): 0 - NEVER BOTHERED
WHAT SINGLE SKIN CONDITION LISTED BELOW IS THE PATIENT ANSWERING THE QUALITY-OF-LIFE ASSESSMENT QUESTIONS ABOUT: NONE OF THE ABOVE
WHAT SINGLE SKIN CONDITION LISTED BELOW IS THE PATIENT ANSWERING THE QUALITY-OF-LIFE ASSESSMENT QUESTIONS ABOUT: NONE OF THE ABOVE

## 2025-07-08 ENCOUNTER — APPOINTMENT (OUTPATIENT)
Dept: DERMATOLOGY | Facility: CLINIC | Age: 55
End: 2025-07-08
Payer: COMMERCIAL

## 2025-07-08 DIAGNOSIS — L98.9 SKIN LESION OF RIGHT LOWER EXTREMITY: Primary | ICD-10-CM

## 2025-07-08 PROCEDURE — 99204 OFFICE O/P NEW MOD 45 MIN: CPT | Performed by: DERMATOLOGY

## 2025-07-08 NOTE — Clinical Note
~1cm pink papule with overlying brown scale. Due to virtual visit, unable to further evaluate with dermoscopy or determine nodularity of lesion. No hemorrhagic crusting noted. Ddx includes DF, BCC, SK.    Discharged

## 2025-07-08 NOTE — PROGRESS NOTES
Subjective     Ramana Reyes is a 55 y.o. male who presents for the following: Suspicious Skin Lesion. Went to his PCP in May, who noted a spot on his right lower shin, and wanted further evaluation. He first noticed the lesion in April; it looks like a scab which hasn't gone away. It is smaller than a dime. He states that the spot has stayed the same size, potentially smaller than when first noted. No itching or bleeding or pain.    No personal history of skin cancers. No family history of skin cancers to his knowledge.     Other medical history: hypertension on olmesartan.     Review of Systems:  No other skin or systemic complaints other than what is documented elsewhere in the note.    The following portions of the chart were reviewed this encounter and updated as appropriate:          Skin Cancer History  Biopsy Log Book  No skin cancers from Specimen Tracking.    Additional History      Specialty Problems    None       Objective   Well appearing patient in no apparent distress; mood and affect are within normal limits.    A focused skin examination was performed. All findings within normal limits unless otherwise noted below.    Assessment/Plan   Skin Exam  1. SKIN LESION OF RIGHT LOWER EXTREMITY  Right Lower Leg - Anterior  ~1cm pink papule with overlying brown scale. Due to virtual visit, unable to further evaluate with dermoscopy or determine nodularity of lesion. No hemorrhagic crusting noted. Ddx includes DF, BCC, SK.   Skin lesion, not specified:  Patient reports 3 month history of pink papule, with similar texture to a scab. Due to virtual nature of this visit, unable to determine the true nature of this lesion. Differential is broad as exam is incomplete, including SK, NMSC, DF.   - Recommend in person evaluation.    RTC for in person evaluation  Related Procedures  Follow Up In Dermatology - Established Patient    Virtual or Telephone Consent    An interactive audio and video telecommunication system  which permits real time communications between the patient (at the originating site) and provider (at the distant site) was utilized to provide this telehealth service.   Verbal consent was requested and obtained from Ramana Reyes on this date, 07/08/25 for a telehealth visit and the patient's location was confirmed at the time of the visit.    Leann Flynn MD   PGY2 Dermatology Resident    I saw and evaluated the patient. I personally obtained the key and critical portions of the history and physical exam or was physically present for key and critical portions performed by the resident/fellow. I reviewed the resident/fellow's documentation and discussed the patient with the resident/fellow. I agree with the resident/fellow's medical decision making as documented in the note.    Cong Casillas MD PhD

## 2025-07-08 NOTE — Clinical Note
Skin lesion, not specified:  Patient reports 3 month history of pink papule, with similar texture to a scab. Due to virtual nature of this visit, unable to determine the true nature of this lesion. Differential is broad as exam is incomplete, including SK, NMSC, DF.   - Recommend in person evaluation.    RTC for in person evaluation

## 2025-07-27 DIAGNOSIS — I10 PRIMARY HYPERTENSION: ICD-10-CM

## 2025-07-28 RX ORDER — OLMESARTAN MEDOXOMIL 20 MG/1
20 TABLET ORAL DAILY
Qty: 90 TABLET | Refills: 1 | Status: SHIPPED | OUTPATIENT
Start: 2025-07-28

## 2025-08-05 ASSESSMENT — PATIENT GLOBAL ASSESSMENT (PGA): WHAT IS THE PGA: PATIENT GLOBAL ASSESSMENT:  1 - CLEAR

## 2025-08-05 ASSESSMENT — DERMATOLOGY QUALITY OF LIFE (QOL) ASSESSMENT
WHAT SINGLE SKIN CONDITION LISTED BELOW IS THE PATIENT ANSWERING THE QUALITY-OF-LIFE ASSESSMENT QUESTIONS ABOUT: NONE OF THE ABOVE
RATE HOW EMOTIONALLY BOTHERED YOU ARE BY YOUR SKIN PROBLEM (FOR EXAMPLE, WORRY, EMBARRASSMENT, FRUSTRATION): 0 - NEVER BOTHERED
RATE HOW BOTHERED YOU ARE BY EFFECTS OF YOUR SKIN PROBLEMS ON YOUR ACTIVITIES (EG, GOING OUT, ACCOMPLISHING WHAT YOU WANT, WORK ACTIVITIES OR YOUR RELATIONSHIPS WITH OTHERS): 0 - NEVER BOTHERED
RATE HOW BOTHERED YOU ARE BY EFFECTS OF YOUR SKIN PROBLEMS ON YOUR ACTIVITIES (EG, GOING OUT, ACCOMPLISHING WHAT YOU WANT, WORK ACTIVITIES OR YOUR RELATIONSHIPS WITH OTHERS): 0 - NEVER BOTHERED
RATE HOW BOTHERED YOU ARE BY SYMPTOMS OF YOUR SKIN PROBLEM (EG, ITCHING, STINGING BURNING, HURTING OR SKIN IRRITATION): 0 - NEVER BOTHERED
RATE HOW BOTHERED YOU ARE BY SYMPTOMS OF YOUR SKIN PROBLEM (EG, ITCHING, STINGING BURNING, HURTING OR SKIN IRRITATION): 0 - NEVER BOTHERED
RATE HOW EMOTIONALLY BOTHERED YOU ARE BY YOUR SKIN PROBLEM (FOR EXAMPLE, WORRY, EMBARRASSMENT, FRUSTRATION): 0 - NEVER BOTHERED
DATE THE QUALITY-OF-LIFE ASSESSMENT WAS COMPLETED: 67296
WHAT SINGLE SKIN CONDITION LISTED BELOW IS THE PATIENT ANSWERING THE QUALITY-OF-LIFE ASSESSMENT QUESTIONS ABOUT: NONE OF THE ABOVE

## 2025-08-06 ENCOUNTER — APPOINTMENT (OUTPATIENT)
Dept: DERMATOLOGY | Facility: CLINIC | Age: 55
End: 2025-08-06
Payer: COMMERCIAL

## 2025-08-06 DIAGNOSIS — D48.5 NEOPLASM OF UNCERTAIN BEHAVIOR OF SKIN: Primary | ICD-10-CM

## 2025-08-06 DIAGNOSIS — L98.9 SKIN LESION OF RIGHT LOWER EXTREMITY: ICD-10-CM

## 2025-08-06 DIAGNOSIS — L57.0 ACTINIC KERATOSIS: ICD-10-CM

## 2025-08-06 DIAGNOSIS — D18.01 HEMANGIOMA OF SKIN: ICD-10-CM

## 2025-08-06 DIAGNOSIS — L73.9 FOLLICULITIS: ICD-10-CM

## 2025-08-06 DIAGNOSIS — D22.5 MELANOCYTIC NEVUS OF TRUNK: ICD-10-CM

## 2025-08-06 DIAGNOSIS — L57.8 DIFFUSE PHOTODAMAGE OF SKIN: ICD-10-CM

## 2025-08-06 PROCEDURE — 11301 SHAVE SKIN LESION 0.6-1.0 CM: CPT | Performed by: DERMATOLOGY

## 2025-08-06 PROCEDURE — 17003 DESTRUCT PREMALG LES 2-14: CPT | Performed by: DERMATOLOGY

## 2025-08-06 PROCEDURE — 99214 OFFICE O/P EST MOD 30 MIN: CPT | Performed by: DERMATOLOGY

## 2025-08-06 PROCEDURE — 17000 DESTRUCT PREMALG LESION: CPT | Performed by: DERMATOLOGY

## 2025-08-06 RX ORDER — CLINDAMYCIN PHOSPHATE 10 UG/ML
LOTION TOPICAL 2 TIMES DAILY
Qty: 60 ML | Refills: 11 | Status: SHIPPED | OUTPATIENT
Start: 2025-08-06 | End: 2026-08-06

## 2025-08-06 NOTE — Clinical Note
Actinic Keratoses -left temple and scattered on right temple.  The pre-cancerous nature of these lesions and treatment options were discussed with the patient today.  At this time, I recommend treatment with liquid nitrogen cryotherapy.  The patient expressed understanding, is in agreement with this plan, and wishes to proceed with cryotherapy today.

## 2025-08-06 NOTE — Clinical Note
Folliculitis -bilateral thighs.  The bacterial nature of this condition and treatment options were discussed with the patient today.  At this time, I recommend topical antibiotic therapy with Clindamycin 1% lotion, which the patient was instructed to apply twice daily to the affected areas or up to 3-4 times per day as needed for active lesions.  The risks, benefits, and side effects of this medication were discussed.  The patient expressed understanding and is in agreement with this plan.

## 2025-08-06 NOTE — Clinical Note
On his left temple and scattered on his right temple, there are 5 erythematous, gritty, scaly macules

## 2025-08-06 NOTE — Clinical Note
Malave Angiomas - the benign nature of these vascular lesions was discussed with the patient today and reassurance provided.  No treatment is medically indicated for these lesions at this time.

## 2025-08-07 NOTE — PROGRESS NOTES
Subjective     Ramana Reyes is a 55 y.o. male who presents for the following: Skin Check.  He notes a scaly bump on his right leg, which has been present for 4 months, has increased in size, and does not heal.  He also notes intermittent pimple breakouts on his thighs.  He denies any other new, changing, or concerning skin lesions; no bleeding, itching, or burning lesions.      Review of Systems:  No other skin or systemic complaints other than what is documented elsewhere in the note.    The following portions of the chart were reviewed this encounter and updated as appropriate:       Skin Cancer History  Biopsy Log Book  No skin cancers from Specimen Tracking.    Additional History      Specialty Problems    None      Past Dermatologic / Past Relevant Medical History:    No history of atypical nevi or skin cancer    Family History:    No family history of melanoma or skin cancer    Social History:    The patient states he works as a  and does not wear sunscreen    Allergies:  Patient has no known allergies.    Current Medications / CAM's:  Current Medications[1]     Objective   Well appearing patient in no apparent distress; mood and affect are within normal limits.    A full examination was performed including scalp, face, eyes, ears, nose, lips, neck, chest, axillae, abdomen, back, bilateral upper extremities, and bilateral lower extremities. All findings within normal limits unless otherwise noted below.    Assessment/Plan   Skin Exam  1. SKIN LESION OF RIGHT LOWER EXTREMITY      This Visit  - Follow Up In Dermatology - Established Patient  2. NEOPLASM OF UNCERTAIN BEHAVIOR OF SKIN (2)  Right Lateral Mid-Leg  7 mm erythematous, scaly papule     - Shave removal    Lesion length (cm):  0.7  Margin per side (cm):  0  Lesion diameter (cm):  0.7  Informed consent: discussed and consent obtained    Timeout: patient name, date of birth, surgical site, and procedure verified    Procedure prep:  Patient was  prepped and draped  Anesthesia: the lesion was anesthetized in a standard fashion    Anesthetic:  1% lidocaine w/ epinephrine 1-100,000 local infiltration  Instrument used: flexible razor blade    Hemostasis achieved with: aluminum chloride    Outcome: patient tolerated procedure well    Post-procedure details: sterile dressing applied and wound care instructions given    Dressing type: bandage and petrolatum      - Staff Communication: Dermatology Local Anesthesia: 1 % Lidocaine / Epinephrine - Amount:0.5ml  Specimen 1 - Dermatopathology- DERM LAB  Differential Diagnosis: ISK v SCCIS  Check Margins Yes/No?:    Comments:    Dermpath Lab: Routine Histopathology (formalin-fixed tissue)  Left Upper Back  5 mm dark brown pigmented, asymmetric macule with an asymmetric pigment network and irregular borders     - Shave removal    Lesion length (cm):  0.5  Margin per side (cm):  0.2  Lesion diameter (cm):  0.9  Informed consent: discussed and consent obtained    Timeout: patient name, date of birth, surgical site, and procedure verified    Procedure prep:  Patient was prepped and draped  Anesthesia: the lesion was anesthetized in a standard fashion    Anesthetic:  1% lidocaine w/ epinephrine 1-100,000 local infiltration  Instrument used: flexible razor blade    Hemostasis achieved with: aluminum chloride    Outcome: patient tolerated procedure well    Post-procedure details: sterile dressing applied and wound care instructions given    Dressing type: bandage and petrolatum      - Staff Communication: Dermatology Local Anesthesia: 1 % Lidocaine / Epinephrine - Amount:0.5ml  Specimen 2 - Dermatopathology- DERM LAB  Differential Diagnosis: DN  Check Margins Yes/No?:    Comments:    Dermpath Lab: Routine Histopathology (formalin-fixed tissue)  3. ACTINIC KERATOSIS (5)  Head - Anterior (Face) (5)  On his left temple and scattered on his right temple, there are 5 erythematous, gritty, scaly macules   Actinic Keratoses -left  temple and scattered on right temple.  The pre-cancerous nature of these lesions and treatment options were discussed with the patient today.  At this time, I recommend treatment with liquid nitrogen cryotherapy.  The patient expressed understanding, is in agreement with this plan, and wishes to proceed with cryotherapy today.  - Destr of lesion - Head - Anterior (Face) (5)  Complexity: simple    Destruction method: cryotherapy    Informed consent: discussed and consent obtained    Lesion destroyed using liquid nitrogen: Yes    Cryotherapy cycles:  1  Outcome: patient tolerated procedure well with no complications    Post-procedure details: wound care instructions given      4. MELANOCYTIC NEVUS OF TRUNK  Generalized  Scattered on the patient's face, neck, trunk, and extremities, there are multiple small, round- to oval-shaped, brown-pigmented and pink-colored, symmetric, uniform-appearing macules and dome-shaped papules  Clinically benign- to slightly atypical-appearing nevi - the clinically benign- to slightly atypical-appearing nature of the remainder of the patient's nevi was discussed with the patient today.  None of the patient's nevi, with the exception of the one noted above, meet threshold for biopsy today.  I emphasized the importance of performing monthly self-skin exams using the ABCDs of monitoring moles, which were reviewed with the patient today and an informational hand-out provided.  I also emphasized the importance of sun avoidance and sun protection with daily sunscreen use.  5. HEMANGIOMA OF SKIN  Generalized  Scattered on the patient's face, neck, trunk, and extremities, there are multiple small, round, cherry red- to purplish-colored, symmetric, uniform, vascular-appearing macules and papules  Cherry Angiomas - the benign nature of these vascular lesions was discussed with the patient today and reassurance provided.  No treatment is medically indicated for these lesions at this time.  6.  FOLLICULITIS  Left Thigh - Anterior  Scattered on the patient's bilateral thighs, there are several follicular-based erythematous, inflammatory papules and pustules  Folliculitis -bilateral thighs.  The bacterial nature of this condition and treatment options were discussed with the patient today.  At this time, I recommend topical antibiotic therapy with Clindamycin 1% lotion, which the patient was instructed to apply twice daily to the affected areas or up to 3-4 times per day as needed for active lesions.  The risks, benefits, and side effects of this medication were discussed.  The patient expressed understanding and is in agreement with this plan.  - clindamycin (Cleocin T) 1 % lotion - Left Thigh - Anterior - Apply topically 2 times a day.  7. DIFFUSE PHOTODAMAGE OF SKIN  Photodistributed  Diffuse photodamage with actinic changes with telangiectasia and mottled pigmentation in sun-exposed areas.  Photodamage.  The signs and symptoms of skin cancer were reviewed and the patient was advised to practice sun protection and sun avoidance, use daily sunscreen, and perform regular self skin exams.  Sun protection was discussed, including avoiding the mid-day sun, wearing a sunscreen with SPF at least 50, and stressing the need for reapplication of sunscreen and applying more than they think they need.          [1]   Current Outpatient Medications:     olmesartan (BENIcar) 20 mg tablet, TAKE 1 TABLET BY MOUTH EVERY DAY, Disp: 90 tablet, Rfl: 1    clindamycin (Cleocin T) 1 % lotion, Apply topically 2 times a day., Disp: 60 mL, Rfl: 11

## 2025-08-08 LAB
LABORATORY COMMENT REPORT: NORMAL
PATH REPORT.FINAL DX SPEC: NORMAL
PATH REPORT.GROSS SPEC: NORMAL
PATH REPORT.MICROSCOPIC SPEC OTHER STN: NORMAL
PATH REPORT.RELEVANT HX SPEC: NORMAL
PATH REPORT.TOTAL CANCER: NORMAL

## 2025-08-25 ENCOUNTER — APPOINTMENT (OUTPATIENT)
Dept: ORTHOPEDIC SURGERY | Facility: CLINIC | Age: 55
End: 2025-08-25
Payer: COMMERCIAL

## 2025-08-25 DIAGNOSIS — R20.0 HAND NUMBNESS: Primary | ICD-10-CM

## 2025-08-25 PROCEDURE — 99203 OFFICE O/P NEW LOW 30 MIN: CPT | Performed by: ORTHOPAEDIC SURGERY

## 2025-08-25 ASSESSMENT — PAIN - FUNCTIONAL ASSESSMENT: PAIN_FUNCTIONAL_ASSESSMENT: NO/DENIES PAIN

## 2025-09-03 ENCOUNTER — TELEPHONE (OUTPATIENT)
Dept: NEUROLOGY | Facility: HOSPITAL | Age: 55
End: 2025-09-03
Payer: COMMERCIAL